# Patient Record
Sex: MALE | Race: WHITE | URBAN - METROPOLITAN AREA
[De-identification: names, ages, dates, MRNs, and addresses within clinical notes are randomized per-mention and may not be internally consistent; named-entity substitution may affect disease eponyms.]

---

## 2021-09-07 LAB — SARS-COV-2 RNA RESP QL NAA+PROBE: NOT DETECTED

## 2021-09-09 ENCOUNTER — HISTORICAL (OUTPATIENT)
Dept: SURGERY | Facility: HOSPITAL | Age: 46
End: 2021-09-09

## 2022-04-30 NOTE — OP NOTE
DATE OF SURGERY:    09/09/2021    SURGEON:  Gilles Loredo MD    PREOPERATIVE DIAGNOSIS:  Mangled left 5th finger.    POSTOPERATIVE DIAGNOSIS:  Mangled left 5th finger.    PROCEDURE:  Revision amputation, left 5th finger.    INDICATION FOR PROCEDURE:  Rigoberto Wells is a __________-[id number] male who was using a chainsaw in which the chainsaw slipped, and he suffered a mangling injury to his left 5th finger which is now nonsalvageable.  He presents for amputation.    ANESTHESIA:  General.    COMPLICATIONS:  None.    PROCEDURE IN DETAIL:  The patient was endotracheally intubated, prepped and draped in the usual sterile fashion.  We made an elliptical incision using a 15-blade scalpel just proximal to the level of the injury of the bone.  The soft tissues were cut sharply.  Extensor and flexor tendons as well as neurovascular bundles were cauterized.  I then used a rongeur to remove the mangled bone down to healthy bone.  This was then washed out with sterile saline solution.  The volar and dorsal skin flaps were reapproximated using a running 3-0 Monocryl.  Sterile dressing was applied.  No complications.  I was scrubbed and present for the entire procedure.        ______________________________  MD MARLENI Valero/RIO  DD:  09/09/2021  Time:  08:36AM  DT:  09/09/2021  Time:  08:44AM  Job #:  [id number]